# Patient Record
Sex: FEMALE | Race: WHITE | URBAN - METROPOLITAN AREA
[De-identification: names, ages, dates, MRNs, and addresses within clinical notes are randomized per-mention and may not be internally consistent; named-entity substitution may affect disease eponyms.]

---

## 2017-11-21 ENCOUNTER — OFFICE VISIT (OUTPATIENT)
Dept: OBGYN CLINIC | Age: 52
End: 2017-11-21

## 2017-11-21 VITALS
WEIGHT: 197 LBS | BODY MASS INDEX: 37.19 KG/M2 | DIASTOLIC BLOOD PRESSURE: 70 MMHG | HEIGHT: 61 IN | SYSTOLIC BLOOD PRESSURE: 128 MMHG

## 2017-11-21 DIAGNOSIS — Z01.419 ENCOUNTER FOR GYNECOLOGICAL EXAMINATION (GENERAL) (ROUTINE) WITHOUT ABNORMAL FINDINGS: Primary | ICD-10-CM

## 2017-11-21 DIAGNOSIS — Z11.51 SPECIAL SCREENING EXAMINATION FOR HUMAN PAPILLOMAVIRUS (HPV): ICD-10-CM

## 2017-11-21 RX ORDER — GLIMEPIRIDE 4 MG/1
TABLET ORAL
Refills: 3 | COMMUNITY
Start: 2017-09-30

## 2017-11-21 RX ORDER — LIRAGLUTIDE 6 MG/ML
INJECTION SUBCUTANEOUS
Refills: 3 | COMMUNITY
Start: 2017-11-13

## 2017-11-21 RX ORDER — LOSARTAN POTASSIUM 50 MG/1
TABLET ORAL
Refills: 3 | COMMUNITY
Start: 2017-09-17

## 2017-11-21 RX ORDER — METFORMIN HYDROCHLORIDE 1000 MG/1
TABLET ORAL
Refills: 3 | COMMUNITY
Start: 2017-10-11

## 2017-11-21 NOTE — PATIENT INSTRUCTIONS
Learning About Calcium  What is calcium? Calcium keeps your bones and muscles-including your heart-healthy and strong. Your body needs vitamin D to absorb calcium. People who don't get enough calcium and vitamin D throughout life have an increased chance of having thin and brittle bones (osteoporosis) in their later years. Thin and brittle bones break easily. They can lead to serious injuries. This is why it's important for you to get enough calcium and vitamin D as a child and as an adult. It helps keep your bones strong as you get older. And it protects you against possible breaks. Your body also uses vitamin D to help your muscles absorb calcium and work well. If your muscles don't get enough calcium, then they can cramp, hurt, or feel weak. You may have long-term (chronic) muscle aches and pains. How much calcium do you need? How much calcium you need each day changes as you age. The [place] of [employer] recommends the following amounts of calcium each day. · Ages 1 to 3 years: 700 milligrams  · Ages 4 to 8 years: 1,000 milligrams  · Ages 5 to 25 years: 1,300 milligrams  · Ages 23 to 48 years: 1,000 milligrams  · Males 46 to 79 years: 1,000 milligrams  · Females 46 to 79 years: 1,200 milligrams  · Ages 70 and older: 1,200 milligrams  Women who are pregnant or breastfeeding need the same amount of calcium and vitamin D as other women their age. How can you get enough calcium? Calcium is in foods such as milk, cheese, and yogurt. Vegetables like broccoli, kale, and Chinese cabbage also have it. You can get calcium if you eat the soft edible bones in canned sardines and canned salmon. Foods with added (fortified) calcium include some cereals, juices, soy drinks, and tofu. The food label will show how much of it was added. You can figure out how much calcium is in a food by looking at the percent daily value section on the nutrition facts label.  The food label assumes the daily value of calcium is 1,000 mg. So if one serving of a food has a daily value of 20% of calcium, that food has 200 mg of calcium in one serving. Some people who don't get enough calcium may need supplements. You can buy them as citrate or carbonate. Calcium carbonate is best absorbed when it is taken with food. Calcium citrate can be absorbed well with or without food. Spreading calcium out over the course of the day can reduce stomach upset. And your body absorbs it better when it is spread over the day. Try not to take more than 500 mg of calcium supplement at a time. Where can you learn more? Go to http://luly-ana.info/. Enter S264 in the search box to learn more about \"Learning About Calcium. \"  Current as of: May 12, 2017  Content Version: 11.4  © 8635-2975 Healthwise, [employer]. Care instructions adapted under license by [employer] (which disclaims liability or warranty for this information). If you have questions about a medical condition or this instruction, always ask your healthcare professional. [place] [address] any warranty or liability for your use of this information.

## 2017-11-21 NOTE — MR AVS SNAPSHOT
Visit Information Date & Time Provider Department Dept. Phone Encounter #  
 11/21/2017 11:00 AM Gisela Power MD [facility] [phone] [id number] Upcoming Health Maintenance Date Due Hepatitis C Screening 1965 PAP AKA CERVICAL CYTOLOGY 1/21/1985 BREAST CANCER SCRN MAMMOGRAM 1/21/2014 FOBT Q 1 YEAR AGE 50-75 1/21/2014 Influenza Age 5 to Adult 8/1/2017 Allergies as of 11/21/2017  Review Complete On: 11/21/2017 By: Shea Gu Severity Noted Reaction Type Reactions Latex  11/21/2017    Rash Tramadol  11/21/2017    Vertigo Current Immunizations  Never Reviewed No immunizations on file. Not reviewed this visit Vitals BP Height(growth percentile) Weight(growth percentile) BMI OB Status Smoking Status 128/70 (BP 1 Location: Right arm, BP Patient Position: Sitting) 5' 0.75\" (1.543 m) 197 lb (89.4 kg) 37.53 kg/m2 Menopause Never Smoker BMI and BSA Data Body Mass Index Body Surface Area  
 37.53 kg/m 2 1.96 m 2 Your Updated Medication List  
  
   
This list is accurate as of: 11/21/17 11:29 AM.  Always use your most recent med list.  
  
  
  
  
 glimepiride 4 mg tablet Commonly known as:  AMARYL TK 1 T PO BID WITH BREAKFAST AND DINNER  
  
 losartan 50 mg tablet Commonly known as:  COZAAR TK 1 T PO QD  
  
 metFORMIN 1,000 mg tablet Commonly known as:  GLUCOPHAGE  
TK 1 T PO BID WC  
  
 VICTOZA 3-KIMBERLEE 0.6 mg/0.1 mL (18 mg/3 mL) Pnij Generic drug:  Liraglutide ADM 1.8 MG SC QD Patient Instructions Learning About Calcium What is calcium? Calcium keeps your bones and muscles-including your heart-healthy and strong. Your body needs vitamin D to absorb calcium. People who don't get enough calcium and vitamin D throughout life have an increased chance of having thin and brittle bones (osteoporosis) in their later years.  Thin and brittle bones break easily. They can lead to serious injuries. This is why it's important for you to get enough calcium and vitamin D as a child and as an adult. It helps keep your bones strong as you get older. And it protects you against possible breaks. Your body also uses vitamin D to help your muscles absorb calcium and work well. If your muscles don't get enough calcium, then they can cramp, hurt, or feel weak. You may have long-term (chronic) muscle aches and pains. How much calcium do you need? How much calcium you need each day changes as you age. The [place] of Medicine recommends the following amounts of calcium each day. · Ages 1 to 3 years: 700 milligrams · Ages 4 to 8 years: 1,000 milligrams · Ages 5 to 18 years: 1,300 milligrams · Ages 23 to 50 years: 1,000 milligrams · Males 51 to 70 years: 1,000 milligrams · Females 51 to 70 years: 1,200 milligrams · Ages 70 and older: 1,200 milligrams Women who are pregnant or breastfeeding need the same amount of calcium and vitamin D as other women their age. How can you get enough calcium? Calcium is in foods such as milk, cheese, and yogurt. Vegetables like broccoli, kale, and Chinese cabbage also have it. You can get calcium if you eat the soft edible bones in canned sardines and canned salmon. Foods with added (fortified) calcium include some cereals, juices, soy drinks, and tofu. The food label will show how much of it was added. You can figure out how much calcium is in a food by looking at the percent daily value section on the nutrition facts label. The food label assumes the daily value of calcium is 1,000 mg. So if one serving of a food has a daily value of 20% of calcium, that food has 200 mg of calcium in one serving. Some people who don't get enough calcium may need supplements. You can buy them as citrate or carbonate. Calcium carbonate is best absorbed when it is taken with food.  Calcium citrate can be absorbed well with or without food. Spreading calcium out over the course of the day can reduce stomach upset. And your body absorbs it better when it is spread over the day. Try not to take more than 500 mg of calcium supplement at a time. Where can you learn more? Go to http://luly-ana.info/. Enter S264 in the search box to learn more about \"Learning About Calcium. \" Current as of: May 12, 2017 Content Version: 11.4 © 2850-6751 Healthwise, [employer]. Care instructions adapted under license by [employer] (which disclaims liability or warranty for this information). If you have questions about a medical condition or this instruction, always ask your healthcare professional. [address] any warranty or liability for your use of this information. Please provide this summary of care documentation to your next provider. If you have any questions after today's visit, please call [phone].

## 2017-11-27 LAB
CYTOLOGIST CVX/VAG CYTO: NORMAL
CYTOLOGY CVX/VAG DOC THIN PREP: NORMAL
DX ICD CODE: NORMAL
HPV I/H RISK 1 DNA CVX QL PROBE+SIG AMP: NEGATIVE
Lab: NORMAL
OTHER STN SPEC: NORMAL
PATH REPORT.FINAL DX SPEC: NORMAL
STAT OF ADQ CVX/VAG CYTO-IMP: NORMAL

## 2020-06-30 ENCOUNTER — OFFICE VISIT (OUTPATIENT)
Dept: OBGYN CLINIC | Age: 55
End: 2020-06-30

## 2020-06-30 VITALS
WEIGHT: 199 LBS | BODY MASS INDEX: 39.07 KG/M2 | DIASTOLIC BLOOD PRESSURE: 91 MMHG | SYSTOLIC BLOOD PRESSURE: 152 MMHG | HEIGHT: 60 IN

## 2020-06-30 DIAGNOSIS — E66.01 SEVERE OBESITY (HCC): ICD-10-CM

## 2020-06-30 DIAGNOSIS — Z01.419 ENCOUNTER FOR GYNECOLOGICAL EXAMINATION (GENERAL) (ROUTINE) WITHOUT ABNORMAL FINDINGS: Primary | ICD-10-CM

## 2020-06-30 NOTE — PROGRESS NOTES
Rocio Lal is a ,  63 y.o. female [facility]  who presents for her annual checkup. She is having no significant problems. Menstrual status:    Her periods are absent in flow. She denies dysmenorrhea. She reports no premenstrual symptoms. The patient is not using HRT. Contraception:    The current method of family planning is post menopausal status. Sexual history:    She  reports being sexually active and has had partner(s) who are Male. She reports using the following method of birth control/protection: None. Medical conditions:    Since her last annual GYN exam about 2017 ago, she has had the following changes in her health history: none. Pap and Mammogram History:    Her most recent Pap smear was normal/-HPV obtained 2017 year(s) ago. The patient had a recent mammogram 20 which was negative for malignancy. Breast Cancer History/Substance Abuse:    She has no family history of breast cancer. Osteoporosis History:    Family history does not include a first or second degree relative with osteopenia or osteoporosis. A bone density scan was not obtained. She is currently not taking calcium and vit D. Past Medical History:   Diagnosis Date    Diabetes ([facility] [address]     Hypertension      No past surgical history on file.   Current Outpatient Medications   Medication Sig Dispense Refill    VICTOZA 3-KIMBERLEE 0.6 mg/0.1 mL (18 mg/3 mL) pnij ADM 1.8 MG SC QD  3    losartan (COZAAR) 50 mg tablet TK 1 T PO QD  3    metFORMIN (GLUCOPHAGE) 1,000 mg tablet TK 1 T PO BID WC  3    glimepiride (AMARYL) 4 mg tablet TK 1 T PO BID WITH BREAKFAST AND DINNER  3     Allergies: Latex and Tramadol   Social History     Socioeconomic History    Marital status:      Spouse name: Not on file    Number of children: Not on file    Years of education: Not on file    Highest education level: Not on file   Occupational History    Not on file   Social Needs  Financial resource strain: Not on file   Jose-Judith insecurity     Worry: Not on file     Inability: Not on file   [employer] needs     Medical: Not on file     Non-medical: Not on file   Tobacco Use    Smoking status: Never Smoker    Smokeless tobacco: Never Used   Substance and Sexual Activity    Alcohol use: Not on file    Drug use: Not on file    Sexual activity: Yes     Partners: Male     Birth control/protection: None   Lifestyle    Physical activity     Days per week: Not on file     Minutes per session: Not on file    Stress: Not on file   Relationships    Social connections     Talks on phone: Not on file     Gets together: Not on file     Attends Baptism service: Not on file     Active member of club or organization: Not on file     Attends meetings of clubs or organizations: Not on file     Relationship status: Not on file    Intimate partner violence     Fear of current or ex partner: Not on file     Emotionally abused: Not on file     Physically abused: Not on file     Forced sexual activity: Not on file   Other Topics Concern    Not on file   Social History Narrative    Not on file     Tobacco History:  reports that she has never smoked. She has never used smokeless tobacco.  Alcohol Abuse:  has no history on file for alcohol. Drug Abuse:  has no history on file for drug. There is no problem list on file for this patient.         Review of Systems - History obtained from the patient  Constitutional: negative for weight loss, fever, night sweats  HEENT: negative for hearing loss, earache, congestion, snoring, sorethroat  CV: negative for chest pain, palpitations, edema  Resp: negative for cough, shortness of breath, wheezing  GI: negative for change in bowel habits, abdominal pain, black or bloody stools  : negative for frequency, dysuria, hematuria, vaginal discharge  MSK: negative for back pain, joint pain, muscle pain  Breast: negative for breast lumps, nipple discharge, galactorrhea  Skin :negative for itching, rash, hives  Neuro: negative for dizziness, headache, confusion, weakness  Psych: negative for anxiety, depression, change in mood  Heme/lymph: negative for bleeding, bruising, pallor    Physical Exam    Visit Vitals  BP (!) 152/91   Ht 5' (1.524 m)   Wt 199 lb (90.3 kg)   BMI 38.86 kg/m²     Constitutional  · Appearance: well-nourished, well developed, alert, in no acute distress    HENT  · Head and Face: appears normal    Neck  · Inspection/Palpation: normal appearance, no masses or tenderness  · Lymph Nodes: no lymphadenopathy present  · Thyroid: gland size normal, nontender, no nodules or masses present on palpation    Chest  · Respiratory Effort: breathing normal  · Auscultation: normal breath sounds    Cardiovascular  · Heart:  · Auscultation: regular rate and rhythm without murmur    Breasts  · Inspection of Breasts: breasts symmetrical, no skin changes, no discharge present, nipple appearance normal, no skin retraction present  · Palpation of Breasts and Axillae: no masses present on palpation, no breast tenderness  · Axillary Lymph Nodes: no lymphadenopathy present    Gastrointestinal  · Abdominal Examination: abdomen non-tender to palpation, normal bowel sounds, no masses present  · Liver and spleen: no hepatomegaly present, spleen not palpable  · Hernias: no hernias identified    Skin  · General Inspection: no rash, no lesions identified    Neurologic/Psychiatric  · Mental Status:  · Orientation: grossly oriented to person, place and time  · Mood and Affect: mood normal, affect appropriate    Genitourinary  · External Genitalia: normal appearance for age, no discharge present, no tenderness present, no inflammatory lesions present, no masses present, no atrophy present  · Vagina: normal vaginal vault without central or paravaginal defects, no discharge present, no inflammatory lesions present, no masses present  · Bladder: non-tender to palpation  · Urethra: appears normal  · Cervix: normal   · Uterus: normal size, shape and consistency  · Adnexa: no adnexal tenderness present, no adnexal masses present  · Perineum: perineum within normal limits, no evidence of trauma, no rashes or skin lesions present  · Anus: anus within normal limits, no hemorrhoids present  · Inguinal Lymph Nodes: no lymphadenopathy present    Assessment:  Routine gynecologic examination  Her current medical status is satisfactory with no evidence of significant gynecologic issues.     Plan:  Counseled re: diet, exercise, healthy lifestyle  Return for yearly wellness visits  Rec annual mammogram

## 2020-10-09 ENCOUNTER — TRANSCRIBE ORDER (OUTPATIENT)
Dept: SCHEDULING | Age: 55
End: 2020-10-09

## 2020-10-09 DIAGNOSIS — R10.31 RIGHT LOWER QUADRANT PAIN: Primary | ICD-10-CM

## 2021-07-01 ENCOUNTER — OFFICE VISIT (OUTPATIENT)
Dept: OBGYN CLINIC | Age: 56
End: 2021-07-01
Payer: COMMERCIAL

## 2021-07-01 VITALS — WEIGHT: 212.4 LBS | BODY MASS INDEX: 41.48 KG/M2 | DIASTOLIC BLOOD PRESSURE: 75 MMHG | SYSTOLIC BLOOD PRESSURE: 131 MMHG

## 2021-07-01 DIAGNOSIS — Z01.419 ENCOUNTER FOR GYNECOLOGICAL EXAMINATION (GENERAL) (ROUTINE) WITHOUT ABNORMAL FINDINGS: Primary | ICD-10-CM

## 2021-07-01 PROCEDURE — 99396 PREV VISIT EST AGE 40-64: CPT | Performed by: OBSTETRICS & GYNECOLOGY

## 2021-07-01 NOTE — PROGRESS NOTES
Dionne Paulson is a ,  61 y.o. female WHITE/NON-HISPANICwho presents for her annual checkup. She is having no significant problems. Menstrual status:    Her periods are absent in flow. She denies dysmenorrhea. She reports no premenstrual symptoms. The patient is not using HRT. Contraception:    The current method of family planning is post menopausal status. Sexual history:    She  reports being sexually active and has had partner(s) who are Male. She reports using the following method of birth control/protection: None. Medical conditions:    Since her last annual GYN exam about 20 ago, she has had the following changes in her health history: none. Pap and Mammogram History:    Her most recent Pap smear was normal/-HPV obtained 2017 year(s) ago. The patient had a recent mammogram 21 which was negative for malignancy. Breast Cancer History/Substance Abuse:    She has no family history of breast cancer. Osteoporosis History:    Family history does not include a first or second degree relative with osteopenia or osteoporosis. A bone density scan was not obtained. She is currently taking calcium and vit D. Past Medical History:   Diagnosis Date    Diabetes ([facility] [address]     Hypertension      No past surgical history on file.   Current Outpatient Medications   Medication Sig Dispense Refill    VICTOZA 3-KIMBERLEE 0.6 mg/0.1 mL (18 mg/3 mL) pnij ADM 1.8 MG SC QD  3    losartan (COZAAR) 50 mg tablet TK 1 T PO QD  3    metFORMIN (GLUCOPHAGE) 1,000 mg tablet TK 1 T PO BID WC  3    glimepiride (AMARYL) 4 mg tablet TK 1 T PO BID WITH BREAKFAST AND DINNER  3     Allergies: Latex and Tramadol   Social History     Socioeconomic History    Marital status:      Spouse name: Not on file    Number of children: Not on file    Years of education: Not on file    Highest education level: Not on file   Occupational History    Not on file   Tobacco Use    Smoking status: Never Smoker    Smokeless tobacco: Never Used   Substance and Sexual Activity    Alcohol use: Not on file    Drug use: Not on file    Sexual activity: Yes     Partners: Male     Birth control/protection: None   Other Topics Concern    Not on file   Social History Narrative    Not on file     Social Determinants of Health     Financial Resource Strain:     Difficulty of Paying Living Expenses:    Food Insecurity:     Worried About Running Out of Food in the Last Year:     [address] in the Last Year:    Transportation Needs:     Lack of Transportation (Medical):  Lack of Transportation (Non-Medical):    Physical Activity:     Days of Exercise per Week:     Minutes of Exercise per Session:    Stress:     Feeling of Stress :    Social Connections:     Frequency of Communication with Friends and Family:     Frequency of Social Gatherings with Friends and Family:     Attends Jehovah's witness Services:     Active Member of Clubs or Organizations:     Attends Club or Organization Meetings:     Marital Status:    Intimate Partner Violence:     Fear of Current or Ex-Partner:     Emotionally Abused:     Physically Abused:     Sexually Abused: Tobacco History:  reports that she has never smoked. She has never used smokeless tobacco.  Alcohol Abuse:  has no history on file for alcohol use. Drug Abuse:  has no history on file for drug use.   Patient Active Problem List   Diagnosis Code    [facility] E66.[facility]         Review of Systems - History obtained from the patient  Constitutional: negative for weight loss, fever, night sweats  HEENT: negative for hearing loss, earache, congestion, snoring, sorethroat  CV: negative for chest pain, palpitations, edema  Resp: negative for cough, shortness of breath, wheezing  GI: negative for change in bowel habits, abdominal pain, black or bloody stools  : negative for frequency, dysuria, hematuria, vaginal discharge  MSK: negative for back pain, joint pain, muscle pain  Breast: negative for breast lumps, nipple discharge, galactorrhea  Skin :negative for itching, rash, hives  Neuro: negative for dizziness, headache, confusion, weakness  Psych: negative for anxiety, depression, change in mood  Heme/lymph: negative for bleeding, bruising, pallor    Physical Exam    Visit Vitals  /75   Wt 212 lb 6.4 oz (96.3 kg)   BMI 41.48 kg/m²     Constitutional  · Appearance: well-nourished, well developed, alert, in no acute distress    HENT  · Head and Face: appears normal    Neck  · Inspection/Palpation: normal appearance, no masses or tenderness  · Lymph Nodes: no lymphadenopathy present  · Thyroid: gland size normal, nontender, no nodules or masses present on palpation    Chest  · Respiratory Effort: breathing normal  · Auscultation: normal breath sounds    Cardiovascular  · Heart:  · Auscultation: regular rate and rhythm without murmur    Breasts  · Inspection of Breasts: breasts symmetrical, no skin changes, no discharge present, nipple appearance normal, no skin retraction present  · Palpation of Breasts and Axillae: no masses present on palpation, no breast tenderness  · Axillary Lymph Nodes: no lymphadenopathy present    Gastrointestinal  · Abdominal Examination: abdomen non-tender to palpation, normal bowel sounds, no masses present  · Liver and spleen: no hepatomegaly present, spleen not palpable  · Hernias: no hernias identified    Skin  · General Inspection: no rash, no lesions identified    Neurologic/Psychiatric  · Mental Status:  · Orientation: grossly oriented to person, place and time  · Mood and Affect: mood normal, affect appropriate    Genitourinary  · External Genitalia: normal appearance for age, no discharge present, no tenderness present, no inflammatory lesions present, no masses present, no atrophy present  · Vagina: normal vaginal vault without central or paravaginal defects, no discharge present, no inflammatory lesions present, no masses present  · Bladder: non-tender to palpation  · Urethra: appears normal  · Cervix: normal   · Uterus: normal size, shape and consistency  · Adnexa: no adnexal tenderness present, no adnexal masses present  · Perineum: perineum within normal limits, no evidence of trauma, no rashes or skin lesions present  · Anus: anus within normal limits, no hemorrhoids present  · Inguinal Lymph Nodes: no lymphadenopathy present    Assessment:  Routine gynecologic examination  Her current medical status is satisfactory with no evidence of significant gynecologic issues.     Plan:  Counseled re: diet, exercise, healthy lifestyle  Return for yearly wellness visits  Rec annual mammogram

## 2022-10-04 ENCOUNTER — PATIENT MESSAGE (OUTPATIENT)
Dept: OBGYN CLINIC | Age: 57
End: 2022-10-04

## 2022-10-06 ENCOUNTER — OFFICE VISIT (OUTPATIENT)
Dept: OBGYN CLINIC | Age: 57
End: 2022-10-06
Payer: COMMERCIAL

## 2022-10-06 VITALS
SYSTOLIC BLOOD PRESSURE: 128 MMHG | HEIGHT: 61 IN | HEART RATE: 91 BPM | WEIGHT: 205.2 LBS | BODY MASS INDEX: 38.74 KG/M2 | DIASTOLIC BLOOD PRESSURE: 82 MMHG

## 2022-10-06 DIAGNOSIS — Z11.3 VENEREAL DISEASE SCREENING: ICD-10-CM

## 2022-10-06 DIAGNOSIS — Z01.419 ENCOUNTER FOR WELL WOMAN EXAM WITH ROUTINE GYNECOLOGICAL EXAM: ICD-10-CM

## 2022-10-06 DIAGNOSIS — Z87.442 PERSONAL HISTORY OF KIDNEY STONES: ICD-10-CM

## 2022-10-06 DIAGNOSIS — Z12.4 ENCOUNTER FOR PAPANICOLAOU SMEAR FOR CERVICAL CANCER SCREENING: Primary | ICD-10-CM

## 2022-10-06 LAB
BILIRUB UR QL STRIP: NEGATIVE
GLUCOSE UR-MCNC: NEGATIVE MG/DL
KETONES P FAST UR STRIP-MCNC: NEGATIVE MG/DL
PH UR STRIP: 5 [PH] (ref 4.6–8)
PROT UR QL STRIP: NEGATIVE
SP GR UR STRIP: 1.03 (ref 1–1.03)
UA UROBILINOGEN AMB POC: NORMAL (ref 0.2–1)
URINALYSIS CLARITY POC: CLEAR
URINALYSIS COLOR POC: YELLOW
URINE BLOOD POC: NEGATIVE
URINE LEUKOCYTES POC: NEGATIVE
URINE NITRITES POC: NEGATIVE

## 2022-10-06 PROCEDURE — 99396 PREV VISIT EST AGE 40-64: CPT | Performed by: OBSTETRICS & GYNECOLOGY

## 2022-10-06 PROCEDURE — 81002 URINALYSIS NONAUTO W/O SCOPE: CPT | Performed by: OBSTETRICS & GYNECOLOGY

## 2022-10-06 RX ORDER — SEMAGLUTIDE 1.34 MG/ML
INJECTION, SOLUTION SUBCUTANEOUS
COMMUNITY
Start: 2022-04-22

## 2022-10-06 RX ORDER — INSULIN DEGLUDEC INJECTION 100 U/ML
INJECTION, SOLUTION SUBCUTANEOUS
COMMUNITY
Start: 2022-09-07

## 2022-10-06 RX ORDER — LOSARTAN POTASSIUM 100 MG/1
TABLET ORAL
COMMUNITY
Start: 2022-08-30

## 2022-10-06 RX ORDER — SODIUM FLUORIDE 6 MG/ML
PASTE, DENTIFRICE DENTAL
COMMUNITY
Start: 2022-10-05

## 2022-10-06 RX ORDER — INSULIN DEGLUDEC 100 U/ML
14 INJECTION, SOLUTION SUBCUTANEOUS DAILY
COMMUNITY

## 2022-10-06 RX ORDER — ERGOCALCIFEROL 1.25 MG/1
CAPSULE ORAL
COMMUNITY
Start: 2022-08-30

## 2022-10-06 NOTE — PROGRESS NOTES
[address] [facility]  [email or web address]  [phone]           Annual Gynecologic Exam  WWE 40-60  Chief Complaint   Patient presents with    Well Woman     RLQ pain       Joya Mcnamara is a 61 y.o.  [address] [facility]  female who presents for an annual exam.  No LMP recorded. Patient is postmenopausal..    Patient has the following concerns today:   Pt previously saw Dr. Tony Ch & did not have a great experience, switching to Dr. Cata Higginbotham. Pt reports right nipple pain that has been worse this past week, pt reports one day her left nipple felt pain. Pt reports the pain could have been there for years. No hx of abn mammograms. Fam hx of breast caner in mother dx at age 80, maternal aunt dx with breast cancer in age 52's. Pt reports RLQ pain intermittent for possibly years, she reports a history of kidney stones, she is planning on seeing urologist next month, she is okay with having urine checked today in our office. Pt c/o RLQ on and off x years. + caffeine intake. Menstrual status:  She does not report dysmenorrhea/painful menses. She does not report heavy menses. She does not report irregular bleeding. Sexual history and Contraception:  Social History     Substance and Sexual Activity   Sexual Activity Not Currently    Partners: Male    Birth control/protection: None       She does not reports new sexual partner(s) in the last year. Preventive Medicine History:  Her most recent Pap smear result: normal was obtained in 2017    Her most recent HR HPV screen was Negative obtained in 2017    She does not have a history of MATTIE 2, 3 or cervical cancer. Breast health:  [facility] Results (most recent):  No results found for this or any previous visit. Last mammogram: approximate date 2022 and was normal.   Breast cancer family updated: see FH.      Past Medical History:   Diagnosis Date    Diabetes ([address]     Hypertension      OB History    Para Term  AB Living   1 1 1 0 0 1   SAB IAB Ectopic Molar Multiple Live Births   0 0 0 0 0 1      # Outcome Date GA Lbr Hemant/2nd Weight Sex Delivery Anes PTL Lv   1 Term 88 40w0d  6 lb 14 oz (3.118 kg) F Vag-Spont  N        No past surgical history on file. Family History   Problem Relation Age of Onset    Diabetes Mother     Hypertension Mother      Social History     Socioeconomic History    Marital status:      Spouse name: Not on file    Number of children: Not on file    Years of education: Not on file    Highest education level: Not on file   Occupational History    Not on file   Tobacco Use    Smoking status: Never    Smokeless tobacco: Never   Substance and Sexual Activity    Alcohol use: Not Currently    Drug use: Not Currently    Sexual activity: Not Currently     Partners: Male     Birth control/protection: None   Other Topics Concern    Not on file   Social History Narrative    Not on file     Social Determinants of Health     Financial Resource Strain: Not on file   Food Insecurity: Not on file   Transportation Needs: Not on file   Physical Activity: Not on file   Stress: Not on file   Social Connections: Not on file   Intimate Partner Violence: Not on file   Housing Stability: Not on file       Allergies   Allergen Reactions    Latex Rash    Tramadol Vertigo       Current Outpatient Medications   Medication Sig    insulin degludec 100 unit/mL (3 mL) inpn 14 Units/day by SubCUTAneous route daily.     Steff Jimenes FlexTouch U-100 100 unit/mL (3 mL) inpn ADMINISTER 30 UNITS UNDER THE SKIN ONCE DAILY IN THE MORNING    semaglutide (Ozempic) 1 mg/dose (4 mg/3 mL) pnij INJECT 1 MG UNDER THE SKIN ONE DAY A WEEK    losartan (COZAAR) 100 mg tablet TAKE 1 TABLET BY MOUTH EVERY DAY FOR BLOOD PRESSURE    fluoride, sodium, 1.1 % pste     ergocalciferol (ERGOCALCIFEROL) 1,250 mcg (50,000 unit) capsule TAKE 1 CAPSULE BY MOUTH ONCE EVERY MONTH    metFORMIN (GLUCOPHAGE) 1,000 mg tablet TK 1 T PO BID WC glimepiride (AMARYL) 4 mg tablet TK 1 T PO BID WITH BREAKFAST AND DINNER    VICTOZA 3-KIMBERLEE 0.6 mg/0.1 mL (18 mg/3 mL) pnij ADM 1.8 MG SC QD (Patient not taking: Reported on 10/6/2022)    losartan (COZAAR) 50 mg tablet TK 1 T PO QD (Patient not taking: Reported on 10/6/2022)     No current facility-administered medications for this visit. Patient Active Problem List   Diagnosis Code    Severe obesity ([facility] [address] E66.01         Review of Systems - History obtained from the patient and patient filled out questionnaire   Constitutional/general, HEENT, CV, Resp, GI, MSK, Neuro, Psych, Heme/lymph, Skin, Breast ROS: no significant complaints except as noted on HPI    Physical Exam  Visit Vitals  /82   Pulse 91   Ht 5' 0.75\" (1.543 m)   Wt 205 lb 3.2 oz (93.1 kg)   BMI 39.09 kg/m²       Constitutional  Appearance: well-nourished, well developed, alert, in no acute distress    HENT  Head and Face: appears normal    Neck  Inspection/Palpation: normal appearance, no masses or tenderness  Lymph Nodes: no lymphadenopathy present  Thyroid: gland size normal, nontender, no nodules or masses present on palpation    Chest  Respiratory Effort: breathing unlabored  Auscultation: normal breath sounds    Cardiovascular  Heart:   Auscultation: regular rate and rhythm without murmur    Breasts  Inspection of Breasts: breasts symmetrical, no skin changes, no discharge present, nipple appearance normal, no skin retraction present  Palpation of Breasts and Axillae: no masses present on palpation, no breast tenderness  Axillary Lymph Nodes: no lymphadenopathy present    Gastrointestinal  Abdominal Examination: abdomen non-tender to palpation, normal bowel sounds, no masses present  Liver and spleen: no hepatomegaly present, spleen not palpable  Hernias: no hernias identified    Genitourinary  External Genitalia: normal appearance for age, no discharge present, no tenderness present, no inflammatory lesions present, no masses present  Vagina: normal vaginal vault without central or paravaginal defects, no discharge present, no inflammatory lesions present, no masses present  Bladder: non-tender to palpation  Urethra: appears normal  Cervix: normal   Uterus: normal size, shape and consistency  Adnexa: no adnexal tenderness present, no adnexal masses present  Perineum: perineum within normal limits, no evidence of trauma, no rashes or skin lesions present  Anus: anus within normal limits, no hemorrhoids present  Inguinal Lymph Nodes: no lymphadenopathy present    Skin  General Inspection: no rash, no lesions identified    Neurologic/Psychiatric  Mental Status:  Orientation: grossly oriented to person, place and time  Mood and Affect: mood normal, affect appropriate    Assessment:  61 y.o.  for well woman exam  Encounter Diagnoses   Name Primary? Encounter for Papanicolaou smear for cervical cancer screening Yes    Encounter for well woman exam with routine gynecological exam     Venereal disease screening     Personal history of kidney stones        Plan:  The patient was counseled about diet, exercise, healthy lifestyle  We discussed current pap smear and HR HPV testing guidelines. I recommended follow up one year for routine annual gynecologic exam or sooner prn  Handouts were given to the patient  I recommended follow up with a primary care physician for chronic medical problems and evaluation of non-gynecologic concerns and to please contact our office with any GYN questions or concerns.   I recommended testing per CDC guidelines and at patient request.     We discussed potential causes of lower abdominal/pelvic pain: GYN, GI, , musculoskeletal, infectious process, adhesions, or other etiology  We discussed evaluation of lower abdominal/pelvic pain: including but not limited to observation, surgical evaluation/laparoscopy, imaging   We discussed treatment of lower abdominal/pelvic pain: including but not limited to: pain medication, hormonal management, surgical intervention, bowel regimen. Since pain can be a symptoms of an underlying abnormal process she is encouraged to contact my office with persistent symptoms for additional evaluation and treatment if needed. FU and US. Keep urology fu.        Folllow up:  [x] return for annual well woman exam in one year or sooner if she is having problems  [] follow up and ultrasound  [] 6 months  [] 3 months  [] 6 weeks   [] 1 month    Orders Placed This Encounter    AMB POC URINALYSIS DIP STICK MANUAL W/O MICRO    PAP IG, APTIMA HPV AND RFX 16/18,45 ([id number])         Results for orders placed or performed in visit on 10/06/22   AMB POC URINALYSIS DIP STICK MANUAL W/O MICRO   Result Value Ref Range    Color (UA POC) Yellow     Clarity (UA POC) Clear     Glucose (UA POC) Negative Negative    Bilirubin (UA POC) Negative Negative    Ketones (UA POC) Negative Negative    Specific gravity (UA POC) 1.030 1.001 - 1.035    Blood (UA POC) Negative Negative    pH (UA POC) 5.0 4.6 - 8.0    Protein (UA POC) Negative Negative    Urobilinogen (UA POC) normal 0.2 - 1    Nitrites (UA POC) Negative Negative    Leukocyte esterase (UA POC) Negative Negative

## 2022-10-12 LAB
CYTOLOGIST CVX/VAG CYTO: NORMAL
CYTOLOGY CVX/VAG DOC CYTO: NORMAL
CYTOLOGY CVX/VAG DOC THIN PREP: NORMAL
CYTOLOGY HISTORY:: NORMAL
DX ICD CODE: NORMAL
HPV I/H RISK 4 DNA CVX QL PROBE+SIG AMP: NEGATIVE
Lab: NORMAL
OTHER STN SPEC: NORMAL
STAT OF ADQ CVX/VAG CYTO-IMP: NORMAL

## 2022-10-12 NOTE — PROGRESS NOTES
Normal pap smear, message sent if [address] active. Update PMH/HM: include: Date of pap, Cytology: wnl. For HR HPV results: list NEG or POS, when done.

## 2022-11-17 ENCOUNTER — OFFICE VISIT (OUTPATIENT)
Dept: OBGYN CLINIC | Age: 57
End: 2022-11-17

## 2022-11-17 VITALS — SYSTOLIC BLOOD PRESSURE: 135 MMHG | DIASTOLIC BLOOD PRESSURE: 83 MMHG | WEIGHT: 205 LBS | BODY MASS INDEX: 39.05 KG/M2

## 2022-11-17 DIAGNOSIS — R10.31 RLQ ABDOMINAL PAIN: ICD-10-CM

## 2022-11-17 DIAGNOSIS — D21.9 FIBROID: Primary | ICD-10-CM

## 2022-11-17 PROCEDURE — 99212 OFFICE O/P EST SF 10 MIN: CPT | Performed by: OBSTETRICS & GYNECOLOGY

## 2022-11-17 NOTE — PROGRESS NOTES
[facility]  [email or web address]  [phone]    Lobo Villanueva MD, [address] [facility]       OB/GYN Problem visit    Chief Complaint:   Chief Complaint   Patient presents with    Follow-up     Follow up for right lower quadrant pain    Ultrasound       Last or next WWE is: Due 10/23    History of Present Illness: This is a new problem being evaluated by this provider. The patient is a 61 y.o.  female who reports coming in for follow up/ultrasound for right lower quadrant pain   She reports the symptoms are has slightly improved. Aggravating factors include none. Alleviating factors include none. She does not have other concerns. LMP: No LMP recorded. Patient is postmenopausal.    PFSH:  Past Medical History:   Diagnosis Date    Diabetes ([address]     H/O mammogram 10/06/2022    normal    Hypertension      History reviewed. No pertinent surgical history. Family History   Problem Relation Age of Onset    Diabetes Mother     Hypertension Mother      Social History     Tobacco Use    Smoking status: Never    Smokeless tobacco: Never   Substance Use Topics    Alcohol use: Not Currently    Drug use: Not Currently     Allergies   Allergen Reactions    Latex Rash    Tramadol Vertigo     Current Outpatient Medications   Medication Sig    insulin degludec 100 unit/mL (3 mL) inpn 14 Units/day by SubCUTAneous route daily.     [place] FlexTouch U-100 100 unit/mL (3 mL) inpn ADMINISTER 30 UNITS UNDER THE SKIN ONCE DAILY IN THE MORNING    semaglutide (Ozempic) 1 mg/dose (4 mg/3 mL) pnij INJECT 1 MG UNDER THE SKIN ONE DAY A WEEK    losartan (COZAAR) 100 mg tablet TAKE 1 TABLET BY MOUTH EVERY DAY FOR BLOOD PRESSURE    fluoride, sodium, 1.1 % pste     ergocalciferol (ERGOCALCIFEROL) 1,250 mcg (50,000 unit) capsule TAKE 1 CAPSULE BY MOUTH ONCE EVERY MONTH    metFORMIN (GLUCOPHAGE) 1,000 mg tablet TK 1 T PO BID WC    glimepiride (AMARYL) 4 mg tablet TK 1 T PO BID WITH BREAKFAST AND DINNER    VICTOZA 3-KIMBERLEE 0.6 mg/0.1 mL (18 mg/3 mL) pnij ADM 1.8 MG SC QD (Patient not taking: No sig reported)    losartan (COZAAR) 50 mg tablet TK 1 T PO QD (Patient not taking: No sig reported)     No current facility-administered medications for this visit. Review of Systems:  History obtained from the patient  Constitutional: negative for fevers, chills and weight loss  ENT ROS: negative for - hearing change, oral lesions or visual changes  Respiratory: negative for cough, wheezing or dyspnea on exertion  Cardiovascular: negative for chest pain, irregular heart beats, exertional chest pressure/discomfort  Gastrointestinal: negative for dysphagia, nausea and vomiting  Genito-Urinary ROS:  see HPI  Inteument/breast: negative for rash, breast lump and nipple discharge  Musculoskeletal:negative for stiff joints, neck pain and muscle weakness  Endocrine ROS: negative for - breast changes, galactorrhea or temperature intolerance  Hematological and Lymphatic ROS: negative for - blood clots, bruising or swollen lymph nodes    Physical Exam:  Visit Vitals  /83   Wt 205 lb (93 kg)   BMI 39.05 kg/m²       GENERAL: alert, well appearing, and in no distress  HEAD: normocephalic, atraumatic. ABDOMEN: soft, nontender, nondistended, no masses or organomegaly   EGBUS: no lesions, no inflammation, no masses  VULVA: normal appearing vulva with no masses, tenderness or lesions  VAGINA: non tender  CERVIX: normal appearing cervix without discharge or lesions, non tender  UTERUS: uterus is normal size, shape, consistency and nontender   ADNEXA: normal adnexa in size, nontender and no masses  NEURO: alert, oriented, normal speech    Assessment:  Encounter Diagnoses   Name Primary?     Fibroid Yes    RLQ abdominal pain    Myoma new finding    Plan:  The patient is advised that she should contact the office if she does not note improvement or if symptoms recur  Recommend follow up with PCP for non-gynecologic complaints and chronic medical problems. She should contact our office with any questions or concerns  She could keep her routine annual exam appointment. Observation/NSAIDS/hormonal management/surgical management: myomectomy/hysterecotmy/UAE  We discussed that she should not grow during the postmenopausal period and that myomas could cause bleeding issues, pain/cramping issues, pressure symptoms or no/mild symptoms. Pt prefers : observation  We discussed potential causes of lower abdominal/pelvic pain: GYN, GI, , musculoskeletal, infectious process, adhesions, or other etiology  We discussed evaluation of lower abdominal/pelvic pain: including but not limited to observation, surgical evaluation/laparoscopy, imaging   We discussed treatment of lower abdominal/pelvic pain: including but not limited to: pain medication, hormonal management, surgical intervention, bowel regimen. Since pain can be a symptoms of an underlying abnormal process she is encouraged to contact my office with persistent symptoms for additional evaluation and treatment if needed. Consider PCP fu for MS causes  Fu and US to document stability of myoma and assess sx        Physician review of ultrasound performed by technician  DIFFICULT SCAN DUE TO PATIENT BODY HABITUS. TA AND TV SCANS PERFORMED FOR BEST VISUALIZATION. UTERUS IS ANTEVERTED, ENLARGED IN SIZE AND HETEROGENEOUS IN ECHOGENICITY. A RIGHT INTRAMURAL CALCIFIED FIBROID IS SEEN AND MEASURED.  ENDOMETRIUM MEASURES 2-3MM IN THICKNESS. NO EVIDENCE OF MASSES OR ABNORMALITIES ARE SEEN. RIGHT OVARY APPEARS WITHIN NORMAL LIMITS. LEFT OVARY APPEARS WITHIN NORMAL LIMITS. NO FREE FLUID SEEN IN THE CDS. Today's ultrasound report and images were reviewed and discussed with the patient. Please see images and imaging report entered by technician in PACS for more detail and progress note and diagnosis entered by MD.    Eva العلي MD      No orders of the defined types were placed in this encounter.       No results found for this visit on 11/17/22.